# Patient Record
Sex: FEMALE | URBAN - METROPOLITAN AREA
[De-identification: names, ages, dates, MRNs, and addresses within clinical notes are randomized per-mention and may not be internally consistent; named-entity substitution may affect disease eponyms.]

---

## 2023-12-24 ENCOUNTER — NURSE TRIAGE (OUTPATIENT)
Dept: ADMINISTRATIVE | Facility: CLINIC | Age: 63
End: 2023-12-24

## 2023-12-24 NOTE — TELEPHONE ENCOUNTER
Patient has cold symptoms. Refuses triage and would like assistance with locating the nearest [facility]. All questions and concerns were addressed.  Advised the patient to call back with any further questions or if symptoms worsen.        Reason for Disposition   General information question, no triage required and triager able to answer question    Protocols used: Information Only Call - No Triage-A-